# Patient Record
(demographics unavailable — no encounter records)

---

## 2017-09-03 NOTE — EMERGENCY ROOM REPORT
History of Present Illness


General


Chief Complaint:  Pain


Source:  Patient





Present Illness


HPI


The patient is a 33 old female presenting with possible skin infection.  She 

states that she noticed a puncture of the left ankle 3 days prior which has now 

become swollen, red, and painful.  Pain is an 8/10 dull ache and does not 

radiate.  Worse with touch.  She denies any fever or chills.  She denies any 

known injury to the area.  She denies any other symptoms


Allergies:  


Coded Allergies:  


     No Known Allergies (Unverified , 8/28/13)





Patient History


Past Medical History:  see triage record


Pertinent Family History:  none


Last Menstrual Period:  8/20/17


Reviewed Nursing Documentation:  PMH: Agreed, PSxH: Agreed





Nursing Documentation-PMH


Past Medical History:  No Stated History





Review of Systems


All Other Systems:  negative except mentioned in HPI





Physical Exam





Vital Signs








  Date Time  Temp Pulse Resp B/P (MAP) Pulse Ox O2 Delivery O2 Flow Rate FiO2


 


9/3/17 14:37 98.2 93 16 123/87 97 Room Air  








Sp02 EP Interpretation:  reviewed, normal


General Appearance:  no apparent distress, alert, GCS 15, non-toxic


Head:  normocephalic, atraumatic


Eyes:  bilateral eye normal inspection, bilateral eye PERRL


ENT:  hearing grossly normal, normal pharynx, no angioedema, normal voice


Neck:  full range of motion, supple/symm/no masses


Respiratory:  chest non-tender, lungs clear, normal breath sounds, speaking 

full sentences


Musculoskeletal:  back normal, gait/station normal, normal range of motion, no 

calf tenderness, inflammation - L lateral ankle, swelling - L lateral ankle, 

tender - TTP over the L lateral ankle


Neurologic:  alert, oriented x3, responsive, motor strength/tone normal, 

sensory intact, speech normal


Psychiatric:  judgement/insight normal, memory normal, mood/affect normal, no 

suicidal/homicidal ideation


Skin:  normal color, warm/dry, well hydrated, rash - erythema to L lateral ankle


Lymphatic:  no adenopathy





Medical Decision Making


PA Attestation


Dr. Moon is my supervising physician. Patient management was discussed with 

my supervising physician


Diagnostic Impression:  


 Primary Impression:  


 Cellulitis


 Qualified Codes:  L03.116 - Cellulitis of left lower limb


ER Course


The patient is a 33 old female presenting with possible skin infection





Ddx considered include but not limited to insect bite, contact dermatitis, 

eczema, cellulitis, abscess





PE: afebrile. NAD


There is edema, erythema, tenderness, and increased warmth over the left 

lateral ankle.  Full active range of motion.





The patient will be discharged home with a prescription for Keflex and she is 

getting ER precautions





Last Vital Signs








  Date Time  Temp Pulse Resp B/P (MAP) Pulse Ox O2 Delivery O2 Flow Rate FiO2


 


9/3/17 15:22 98.2  16 123/87 97 Room Air  


 


9/3/17 14:37  93      








Status:  improved


Disposition:  HOME, SELF-CARE


Condition:  Improved


Scripts


Cephalexin* (KEFLEX*) 500 Mg Capsule


500 MG ORAL EVERY 6 HOURS, #28 CAP


   Prov: SHANIA MCCLENDON         9/3/17 


Ibuprofen* (MOTRIN*) 600 Mg Tablet


600 MG ORAL Q8H Y for For Pain, #30 TAB 0 Refills


   Prov: SHANIA MCCLENDON         9/3/17


Referrals:  


[facility],REFERRING (PCP)


Patient Instructions:  Cellulitis





Additional Instructions:  


I discussed my findings with the patient. All questions and concerns have been 

answered. Treatment and medication compliance have been addressed. I advised 

the patient that they need to follow up with PMD in 3-5 days. Return to ED if 

symptoms worsen, new symptoms arise, or if needed for any reason. Patient 

verbalized understanding of discharge instructions.











SHANIA MCCLENDON Sep 3, 2017 20:33

## 2017-11-18 NOTE — EMERGENCY ROOM REPORT
History of Present Illness


General


Chief Complaint:  Earache


Source:  Patient





Present Illness


HPI


33-year-old female presents to the emergency department complaining of 10 out 

of 10 in severity localized pain to the left ear with swelling and white 

purulent drainage with some erythema.  Patient has had a history of skin 

infection in the past which required draining and she is presenting with 

similar symptoms.  Patient denies fevers, chills, and changes in hearing, rashes

, or ear pain, discharge from the inner ear or swollen tender lymph nodes.  

Patient denies neck pain or stiffness. Denies CP, Palpitations, LOC, AMS, 

dizziness, Changes in Vision, Sensation, paresthesias, or a sudden severe 

headache.


Allergies:  


Coded Allergies:  


     No Known Allergies (Unverified , 8/28/13)





Patient History


Past Medical History:  see triage record


Past Surgical History:  none


Pertinent Family History:  none


Last Menstrual Period:  11/05/17


Immunizations:  UTD


Reviewed Nursing Documentation:  PMH: Agreed, PSxH: Agreed





Nursing Documentation-PMH


Past Medical History:  No Stated History





Review of Systems


All Other Systems:  negative except mentioned in HPI





Physical Exam





Vital Signs








  Date Time  Temp Pulse Resp B/P (MAP) Pulse Ox O2 Delivery O2 Flow Rate FiO2


 


11/18/17 15:30 98.2 95 16 152/92 98 Room Air  








Sp02 EP Interpretation:  reviewed, normal


General Appearance:  no apparent distress, alert, GCS 15, non-toxic


Head:  normocephalic, atraumatic


ENT:  hearing grossly normal, normal voice, TMs + canals normal, other - 

Sebaceous Cyst on the external left ear: lower helix area.


Neck:  full range of motion


Respiratory:  lungs clear, normal breath sounds, speaking full sentences


Cardiovascular #1:  regular rate, rhythm


Musculoskeletal:  back normal, gait/station normal, normal range of motion


Neurologic:  alert, oriented x3, responsive, motor strength/tone normal, 

sensory intact, speech normal


Skin:  normal color, no rash, warm/dry, well hydrated, other - sebaceous cyst, 

inflammed to the lower left helix.


Lymphatic:  no adenopathy





Procedures


Incision and Drainage


Incision and Drainage :  


   Consent:  Verbal


   Site:  left lower helix of the ear


   Blade Size:  20g needle


   I & D Procedure:  betadine prep


   Wound Location:  head - left ear lower helix


   Wound's Depth, Shape:  superficial


   Wound Length (cm):  1


   Wound Explored:  contaminated - thick purulent foul smelling d/c is expressed


   Patient Tolerated:  Well


   Complications:  None





Medical Decision Making


PA Attestation


Dr. Ruvalcaba  is my supervising Physician whom patient management has been 

discussed with.


Diagnostic Impression:  


 Primary Impression:  


 Sebaceous cyst of ear


ER Course


33-year-old female presents to the emergency department complaining of 10 out 

of 10 in severity localized pain to the left ear with swelling and white 

purulent drainage with some erythema.  Patient has had a history of skin 

infection in the past which required draining and she is presenting with 

similar symptoms.  Patient denies fevers, chills, and changes in hearing, rashes

, or ear pain, discharge from the inner ear or swollen tender lymph nodes.  

Patient denies neck pain or stiffness. Denies CP, Palpitations, LOC, AMS, 

dizziness, Changes in Vision, Sensation, paresthesias, or a sudden severe 

headache.





Ddx considered but are not limited to cellulitis, abscess, cystic acne, 

necrotizing fasciitis, insect bite.





Vital signs: are WNL, pt. is afebrile


H&PE are most consistent with  sebaceous cyst of the left ear with acute 

inflammation





ORDERS: none required at this time, the diagnosis is clinical





ED INTERVENTIONS:


- verbal consent was obtained by pt. for Needle Aspiration, and manual pressure 

to assist drainage. Thick white foul smelling d/c is expressed.  bleeding is 

minimal, pt. tolerated well without anesthesia. there were no complications.








d/w pt. that cyst will re-occur unless surgically removed- this is performed as 

an outpatient, follow up with PCP to facilitate this. 








DISCHARGE: At this time pt. is stable for d/c to home. Will provide printed 

patient care instructions, and any necessary prescriptions. Care plan and 

follow up instructions have been discussed with the patient prior to discharge.





Last Vital Signs








  Date Time  Temp Pulse Resp B/P (MAP) Pulse Ox O2 Delivery O2 Flow Rate FiO2


 


11/18/17 15:30 98.2 95 16 152/92 98 Room Air  








Disposition:  HOME, SELF-CARE


Condition:  Stable


Scripts


Clindamycin Hcl (CLINDAMYCIN HCL) 300 Mg Capsule


300 MG ORAL FOUR TIMES A DAY for 5 Days, #20 CAP


   Prov: Shelby Chris P.AFabien         11/18/17 


Bacitracin/Polymyxin B Sulfate (BACITRACIN-POLYMYXIN OINTMENT) 28.35 Gm 

Oint...g.


1 APPLIC TP BID, #28.3 GM


   Prov: Shelby Chris         11/18/17


Patient Instructions:  Sebaceous Cyst Removal





Additional Instructions:  


Take medications as directed. 


 ** Follow up with a Primary Care Provider in 3-5 days, even if your symptoms 

have resolved. ** 


--Please review list of primary care clinics, if you do not already have a 

primary care provider





Return sooner to ED if new symptoms occur, or current symptoms become worse. 











- Please note that this Emergency Department Report was dictated using [employer] technology software, occasionally this can lead to 

erroneous entry secondary to interpretation by the dictation equipment.











Shelby Chris Nov 18, 2017 15:41

## 2017-12-27 NOTE — EMERGENCY ROOM REPORT
History of Present Illness


General


Chief Complaint:  Skin Rash/Abscess


Source:  Patient





Present Illness


[facility]


The patient is a 33-year-old female presenting for rash.  She noticed this 3 

days prior after sleeping on a couch.  She describes this is extremely itchy.  

She denies any pain.  She noticed bumps on the left shoulder and both legs.  

She has not tried any medications at.  She denies any other symptoms including 

fever, chills, nausea, vomiting


Allergies:  


Coded Allergies:  


     No Known Allergies (Unverified , 13)





Patient History


Past Medical History:  see triage record


Pertinent Family History:  none


Last Menstrual Period:  17.


Pregnant Now:  No


Reviewed Nursing Documentation:  PMH: Agreed, PSxH: Agreed





Nursing Documentation-PMH


Past Medical History:  No Stated History





Review of Systems


All Other Systems:  negative except mentioned in HPI





Physical Exam





Vital Signs








  Date Time  Temp Pulse Resp B/P (MAP) Pulse Ox O2 Delivery O2 Flow Rate FiO2


 


17 15:39 98.1 109 19 121/75 98 Room Air  








Sp02 EP Interpretation:  reviewed, normal


General Appearance:  no apparent distress, alert, GCS 15, non-toxic


Head:  normocephalic, atraumatic


Eyes:  bilateral eye normal inspection, bilateral eye PERRL


ENT:  hearing grossly normal, normal pharynx, no angioedema, normal voice


Respiratory:  chest non-tender, lungs clear, normal breath sounds, speaking 

full sentences


Musculoskeletal:  back normal, gait/station normal, normal range of motion, non-

tender


Neurologic:  alert, oriented x3, responsive, motor strength/tone normal, 

sensory intact, speech normal


Psychiatric:  judgement/insight normal, memory normal, mood/affect normal, no 

suicidal/homicidal ideation


Skin:  rash - There are multiple papules on the left shoulder as well as 

bilateral lower extremities.  Left mid thigh has a lesion with surrounding 

erythema.  Indurated.  Tender to touch.


Lymphatic:  no adenopathy





Medical Decision Making


PA Attestation


Dr. Moon is my supervising physician. Patient management was discussed with 

my supervising physician


Diagnostic Impression:  


 Primary Impression:  


 Insect bite


 Qualified Codes:  W57.XXXA - Bitten or stung by nonvenomous insect and other 

nonvenomous arthropods, initial encounter


 Additional Impression:  


 Cellulitis


 Qualified Codes:  L03.119 - Cellulitis of unspecified part of limb


ER Course


The patient is a 33-year-old female presenting for rash.





Ddx considered include but not limited to insect bite, contact dermatitis, 

eczema, cellulitis





PE: afebrile. NAD


There are multiple papules on the left shoulder as well as bilateral lower 

extremities.  Left mid thigh has a lesion with surrounding erythema.  

Indurated.  Tender to touch.No central clearing.  No targets sign





The patient will be treated for insect bite with possible infection.  She is 

given prescription for topical steroids as well as oral antibiotics.  ER 

precautions are given





Last Vital Signs








  Date Time  Temp Pulse Resp B/P (MAP) Pulse Ox O2 Delivery O2 Flow Rate FiO2


 


17 17:29 98.1 97 19 118/69 98 Room Air  








Status:  improved


Disposition:  HOME, SELF-CARE


Condition:  


Scripts


Cephalexin* (KEFLEX*) 500 Mg Capsule


500 MG ORAL EVERY 12 HOURS, #14 CAP 0 Refills


   Prov: SHANIA MCCLENDON.A.         17 


Triamcinolone Acet (Triamcinolone Acetonide) 15 Gm Cream..g.


0.1 % APPLIC TID, #15 GM


   Prov: SHANIA MCCLENDON.A.         17 


Diphenhydramine Hcl* (BENADRYL*) 25 Mg Capsule


25 MG ORAL Q6H Y for Itching, #30 CAP


   Prov: SHANIA MCCLENDON.A.         17


Patient Instructions:  Rash





Additional Instructions:  


I discussed my findings with the patient. All questions and concerns have been 

answered. Treatment and medication compliance have been addressed. I advised 

the patient that they need to follow up with PMD in 3-5 days. Return to ED if 

symptoms worsen, new symptoms arise, or if needed for any reason. Patient 

verbalized understanding of discharge instructions.











SHANIA MCCLENDON Dec 27, 2017 23:54

## 2018-04-16 NOTE — EMERGENCY ROOM REPORT
History of Present Illness


General


Chief Complaint:  Skin Rash/Abscess


Source:  Patient





Present Illness


HPI


34 YO Female presents with itching, swelling and erythema to two lesions on the 

right arm. Patient states her arm is tender but she is not having pain at this 

time.  She states onset of her symptoms were 2 days ago.  She denies fevers, 

chills, recent travel or ill contacts.  Patient states that there are a lot of 

trees outside where she lives and she is constantly getting bug bites.  Patient 

denies hiking. Denies lesions/rashes elsewhere on the body. Denies new 

medications or body washes or creams. Denies swelling of the lips, tongue , 

throat or airway. Denies wheezing, or shortness of breath.  Denies recent travel

, recent illness or ill contacts.  denies blisters, oral lesions, or sloughing 

of the skin.


Allergies:  


Coded Allergies:  


     No Known Allergies (Unverified , 8/28/13)





Patient History


Past Medical History:  see triage record


Past Surgical History:  none


Pertinent Family History:  none


Last Menstrual Period:  04/14/18


Pregnant Now:  No


Reviewed Nursing Documentation:  PMH: Agreed; PSxH: Agreed





Nursing Documentation-PMH


Past Medical History:  No Stated History





Review of Systems


All Other Systems:  negative except mentioned in HPI





Physical Exam





Vital Signs








  Date Time  Temp Pulse Resp B/P (MAP) Pulse Ox O2 Delivery O2 Flow Rate FiO2


 


4/16/18 15:07 97.6 96 18 114/76 99 Room Air  





 97.5       








Sp02 EP Interpretation:  reviewed, normal


General Appearance:  no apparent distress, alert, GCS 15, non-toxic


Head:  normocephalic, atraumatic


ENT:  hearing grossly normal, no angioedema, normal voice, other - no swelling 

of the lips or tongue


Neck:  full range of motion


Respiratory:  chest non-tender, lungs clear, normal breath sounds, no rhonchi, 

no wheezing, speaking full sentences


Cardiovascular #1:  regular rate, rhythm, no edema, normal capillary refill


Musculoskeletal:  back normal, gait/station normal, normal range of motion, non-

tender


Neurologic:  alert, oriented x3, responsive, motor strength/tone normal, 

sensory intact, speech normal, grossly normal


Psychiatric:  judgement/insight normal


Skin:  warm/dry, well hydrated, other - two circular scabbed lesions on the 

right forearm with surrounding erythema and increased temperature to palpation. 

no blisters or vesicles.


Lymphatic:  no adenopathy





Medical Decision Making


PA Attestation


Dr. Velazco is my supervising Physician whom patient management has been 

discussed with.


Diagnostic Impression:  


 Primary Impression:  


 Cellulitis


 Qualified Codes:  L03.113 - Cellulitis of right upper limb


 Additional Impression:  


 Insect bite


 Qualified Codes:  W57.XXXA - Bitten or stung by nonvenomous insect and other 

nonvenomous arthropods, initial encounter


ER Course


34 YO Female presents with itching, swelling and erythema to two lesions on the 

right arm. Patient states her arm is tender but she is not having pain at this 

time.  She states onset of her symptoms were 2 days ago.  She denies fevers, 

chills, recent travel or ill contacts.  Patient states that there are a lot of 

trees outside where she lives and she is constantly getting bug bites.  Patient 

denies hiking. Denies lesions/rashes elsewhere on the body. Denies new 

medications or body washes or creams. Denies swelling of the lips, tongue , 

throat or airway. Denies wheezing, or shortness of breath.  Denies recent travel

, recent illness or ill contacts.  denies blisters, oral lesions, or sloughing 

of the skin. 





Ddx considered but are not limited to cellulitis, scabies, insect bites, tic 

bites, spider bites, contact dermatitis, Drug reaction, allergic reaction, 

fungal infection, lice. 





Vital signs: are WNL, pt. is afebrile





H&PE are most consistent with insect bites with secondary cellulitis.





ORDERS: none required at this time, the diagnosis is clinical





ED INTERVENTIONS: None required at this time. 





DISCHARGE: At this time pt. is stable for d/c to home. Will provide printed 

patient care instructions, and any necessary prescriptions. Care plan and 

follow up instructions have been discussed with the patient prior to discharge.





Last Vital Signs








  Date Time  Temp Pulse Resp B/P (MAP) Pulse Ox O2 Delivery O2 Flow Rate FiO2


 


4/16/18 15:07 97.6 96 18 114/76 99 Room Air  





 97.6       








Disposition:  HOME, SELF-CARE


Condition:  Stable


Scripts


Bacitracin/Polymyxin B Sulfate (BACITRACIN-POLYMYXIN OINTMENT) 28.35 Gm 

Oint...g.


1 APPLIC TP BID, #28.3 GM


   Prov: Shelby Chris         4/16/18 


Cephalexin* (KEFLEX*) 500 Mg Capsule


500 MG ORAL EVERY 12 HOURS for 7 Days, #14 CAP 0 Refills


   Prov: Shelby Chris         4/16/18 


Trimethoprim/Sulfamethoxazole 160/800* (BACTRIM DS TABLET*) 1 Each Tablet


1 TAB ORAL TWICE A DAY for 7 Days, #14 TAB


   Prov: Shelby Chris         4/16/18 


Hydrocortisone (Hydrocortisone Cream 2.5%) Y Cream.appl


1 APPLIC TP BID, #28.3 GM


   Prov: Shelby Chris         4/16/18


Patient Instructions:  Cellulitis, Easy-to-Read, Insect Bite, Easy-to-Read





Additional Instructions:  


Take medications as directed. 


 ** Follow up with a Primary Care Provider in 3-5 days, even if your symptoms 

have resolved. ** 


--Please review list of primary care clinics, if you do not already have a 

primary care provider





Return sooner to ED if new symptoms occur, or current symptoms become worse. 








- Please note that this Emergency Department Report was dictated using [employer] technology software, occasionally this can lead to 

erroneous entry secondary to interpretation by the dictation equipment.











Shelby Chris Apr 16, 2018 15:32